# Patient Record
Sex: FEMALE | Race: WHITE | NOT HISPANIC OR LATINO | Employment: UNEMPLOYED | URBAN - METROPOLITAN AREA
[De-identification: names, ages, dates, MRNs, and addresses within clinical notes are randomized per-mention and may not be internally consistent; named-entity substitution may affect disease eponyms.]

---

## 2017-12-11 PROBLEM — I48.0 AF (PAROXYSMAL ATRIAL FIBRILLATION): Status: ACTIVE | Noted: 2017-12-11

## 2018-04-13 PROBLEM — I49.5: Status: ACTIVE | Noted: 2018-04-13

## 2018-04-14 PROBLEM — Z95.0 STATUS POST PLACEMENT OF OTHER CARDIAC PACEMAKER: Status: ACTIVE | Noted: 2018-04-14

## 2018-04-27 ENCOUNTER — TELEPHONE (OUTPATIENT)
Dept: PRIMARY CARE CLINIC | Facility: CLINIC | Age: 83
End: 2018-04-27

## 2018-04-27 ENCOUNTER — OFFICE VISIT (OUTPATIENT)
Dept: PRIMARY CARE CLINIC | Facility: CLINIC | Age: 83
End: 2018-04-27
Payer: MEDICARE

## 2018-04-27 ENCOUNTER — CLINICAL SUPPORT (OUTPATIENT)
Dept: PRIMARY CARE CLINIC | Facility: CLINIC | Age: 83
End: 2018-04-27
Payer: MEDICARE

## 2018-04-27 VITALS
WEIGHT: 173.5 LBS | OXYGEN SATURATION: 93 % | HEIGHT: 65 IN | BODY MASS INDEX: 28.91 KG/M2 | HEART RATE: 65 BPM | DIASTOLIC BLOOD PRESSURE: 69 MMHG | RESPIRATION RATE: 18 BRPM | SYSTOLIC BLOOD PRESSURE: 130 MMHG | TEMPERATURE: 98 F

## 2018-04-27 DIAGNOSIS — N30.90 CYSTITIS: Primary | ICD-10-CM

## 2018-04-27 DIAGNOSIS — N18.4 CKD (CHRONIC KIDNEY DISEASE) STAGE 4, GFR 15-29 ML/MIN: ICD-10-CM

## 2018-04-27 PROBLEM — E78.2 MIXED HYPERLIPIDEMIA: Status: ACTIVE | Noted: 2018-04-27

## 2018-04-27 PROBLEM — C18.9 COLON CANCER: Status: ACTIVE | Noted: 2018-04-27

## 2018-04-27 LAB
BILIRUB SERPL-MCNC: ABNORMAL MG/DL
BLOOD URINE, POC: ABNORMAL
COLOR, POC UA: YELLOW
GLUCOSE UR QL STRIP: NORMAL
KETONES UR QL STRIP: ABNORMAL
LEUKOCYTE ESTERASE URINE, POC: ABNORMAL
NITRITE, POC UA: POSITIVE
PH, POC UA: 5
PROTEIN, POC: ABNORMAL
SPECIFIC GRAVITY, POC UA: 1.01
UROBILINOGEN, POC UA: NORMAL

## 2018-04-27 PROCEDURE — 87186 SC STD MICRODIL/AGAR DIL: CPT

## 2018-04-27 PROCEDURE — 87086 URINE CULTURE/COLONY COUNT: CPT

## 2018-04-27 PROCEDURE — 87077 CULTURE AEROBIC IDENTIFY: CPT

## 2018-04-27 PROCEDURE — 99999 PR PBB SHADOW E&M-EST. PATIENT-LVL IV: CPT | Mod: PBBFAC,,, | Performed by: NURSE PRACTITIONER

## 2018-04-27 PROCEDURE — 99214 OFFICE O/P EST MOD 30 MIN: CPT | Mod: PBBFAC,27,PN | Performed by: NURSE PRACTITIONER

## 2018-04-27 PROCEDURE — 99999 PR PBB SHADOW E&M-EST. PATIENT-LVL I: CPT | Mod: PBBFAC,,,

## 2018-04-27 PROCEDURE — 87088 URINE BACTERIA CULTURE: CPT

## 2018-04-27 PROCEDURE — 99211 OFF/OP EST MAY X REQ PHY/QHP: CPT | Mod: PBBFAC,PN

## 2018-04-27 PROCEDURE — 81002 URINALYSIS NONAUTO W/O SCOPE: CPT | Mod: PBBFAC,PN | Performed by: NURSE PRACTITIONER

## 2018-04-27 PROCEDURE — 99214 OFFICE O/P EST MOD 30 MIN: CPT | Mod: S$PBB,,, | Performed by: NURSE PRACTITIONER

## 2018-04-27 RX ORDER — NITROFURANTOIN MACROCRYSTALS 50 MG/1
50 CAPSULE ORAL 2 TIMES DAILY
Qty: 14 CAPSULE | Refills: 0 | Status: SHIPPED | OUTPATIENT
Start: 2018-04-27 | End: 2018-04-27

## 2018-04-27 RX ORDER — MINOCYCLINE HYDROCHLORIDE 100 MG/1
100 CAPSULE ORAL 2 TIMES DAILY
Qty: 14 CAPSULE | Refills: 0 | Status: SHIPPED | OUTPATIENT
Start: 2018-04-27 | End: 2018-05-04

## 2018-04-27 RX ORDER — ALBUTEROL SULFATE 90 UG/1
2 AEROSOL, METERED RESPIRATORY (INHALATION) EVERY 4 HOURS PRN
COMMUNITY

## 2018-04-27 NOTE — TELEPHONE ENCOUNTER
----- Message from Carolyn Alejandro sent at 4/27/2018 11:46 AM CDT -----  Contact: Lety with [facility]  The doctors office sent over prescriptions for two antibiotics and they are trying to find out which one they need to fill for the patient.  Call Back#[phone]  Thanks     [facility] [address]  [address] [place] [id number]  Phone: [phone] Fax: [phone]

## 2018-04-27 NOTE — TELEPHONE ENCOUNTER
[facility], notified her I advised patient not to take macrobid and to fill monocyline. Mureil states she will fill meds for patient.

## 2018-04-27 NOTE — PROGRESS NOTES
"Chief Complaint  Chief Complaint   Patient presents with    Urinary Tract Infection       HPI  Alannah Saleem is a 84 y.o. female with multiple medical diagnoses as listed in the medical history and problem list that presents for urinary frequency and hesitancy.    Urinary frequency - Patient reports he onset of urinary frequency and hesitancy approximately one week ago.  No dysuria, no fevers, no back pain.  Accompanied by fatigue.  Presents with daughter who states that mother has appeared slightly disoriented over the last few days and appears as when she frequently gets urinary tract infections.    Pacemaker-patient with a history of sick sinus syndrome with pacemaker insertion approximately 2 weeks ago.  Under the care of Dr. Encarnacion.  Follow-up in May 19.    CKD4-patient currently under the care Dr. Encarnacion for cardiology.  States that he is aware of her renal function.  Has recently seen a nephrologist and isn't interested do so at this time.    PAST MEDICAL HISTORY:  Past Medical History:   Diagnosis Date    A-fib 2017    Abnormal stress test     Arthritis     Cancer     colon cancer 2013  had chemo    Carotid artery disease     CKD (chronic kidney disease)     CKD (chronic kidney disease)     Coronary artery disease     Hyperlipidemia     Hypertension     Hypoxia     Pulmonary hypertension     SSS (sick sinus syndrome) 2018       PAST SURGICAL HISTORY:  Past Surgical History:   Procedure Laterality Date    ABDOMINAL SURGERY      3" of colon was removed  colectomy    APPENDECTOMY      CHOLECYSTECTOMY      coloscopy      CORONARY ARTERY BYPASS GRAFT      EYE SURGERY      cataracts    HYSTERECTOMY      loop recorder Left     misc  2017    loop recorder implant       SOCIAL HISTORY:  Social History     Social History    Marital status:      Spouse name: N/A    Number of children: N/A    Years of education: N/A     Occupational History    Not on file.     Social History Main " "Topics    Smoking status: Never Smoker    Smokeless tobacco: Never Used    Alcohol use No    Drug use: No    Sexual activity: Not on file     Other Topics Concern    Not on file     Social History Narrative    No narrative on file       FAMILY HISTORY:  History reviewed. No pertinent family history.    ALLERGIES AND MEDICATIONS: updated and reviewed.  Review of patient's allergies indicates:   Allergen Reactions    Levaquin [levofloxacin] Swelling    Lisinopril Rash    Penicillins Swelling    Sulfa (sulfonamide antibiotics)      Current Outpatient Prescriptions   Medication Sig Dispense Refill    albuterol (PROAIR HFA) 90 mcg/actuation inhaler Take 2 puffs by mouth every 4 (four) hours as needed.      amiodarone (PACERONE) 200 MG Tab Take 200 mg by mouth once daily.      aspirin (ECOTRIN) 81 MG EC tablet Take 81 mg by mouth once daily.       atorvastatin (LIPITOR) 80 MG tablet Take 40 mg by mouth every evening. Note dosage ordered 40 mg      fluticasone (FLONASE) 50 mcg/actuation nasal spray 2 sprays by Each Nare route 2 (two) times daily.       nitrofurantoin (MACRODANTIN) 50 MG capsule Take 1 capsule (50 mg total) by mouth 2 (two) times daily. 14 capsule 0     No current facility-administered medications for this visit.          ROS  Review of Systems      PHYSICAL EXAM  Vitals:    04/27/18 0951   BP: 130/69   BP Location: Right arm   Patient Position: Sitting   BP Method: Medium (Automatic)   Pulse: 65   Resp: 18   Temp: 98.1 °F (36.7 °C)   TempSrc: Oral   SpO2: (!) 93%   Weight: 78.7 kg (173 lb 8 oz)   Height: 5' 5" (1.651 m)    Body mass index is 28.87 kg/m².  Weight: 78.7 kg (173 lb 8 oz)   Height: 5' 5" (165.1 cm)     Physical Exam      Health Maintenance       Date Due Completion Date    TETANUS VACCINE 11/23/1951 ---    DEXA SCAN 11/23/1973 ---    Zoster Vaccine 11/23/1993 ---    Pneumococcal (65+) (1 of 2 - PCV13) 11/23/1998 ---    Influenza Vaccine 08/01/2017 ---    Lipid Panel 01/13/2021 " 1/13/2016            Assessment & Plan    Alannah was seen today for urinary tract infection.    Diagnoses and all orders for this visit:    Cystitis  -     POCT URINE DIPSTICK WITHOUT MICROSCOPE  -     Urine culture  Called in minocycline for UTI.  Recommended recheck after completion of abx.      CKD (chronic kidney disease) stage 4, GFR 15-29 ml/min        Patient and daughter not interested in  seeing a nephrologist at this time.  Instructed to discuss with Dr. Encarnacion at her next visit.            Follow-up: No Follow-up on file.  \

## 2018-04-30 LAB — BACTERIA UR CULT: NORMAL

## 2018-05-11 ENCOUNTER — OFFICE VISIT (OUTPATIENT)
Dept: PRIMARY CARE CLINIC | Facility: CLINIC | Age: 83
End: 2018-05-11
Payer: MEDICARE

## 2018-05-11 VITALS
WEIGHT: 170.38 LBS | HEART RATE: 72 BPM | HEIGHT: 65 IN | OXYGEN SATURATION: 96 % | RESPIRATION RATE: 18 BRPM | DIASTOLIC BLOOD PRESSURE: 68 MMHG | SYSTOLIC BLOOD PRESSURE: 110 MMHG | BODY MASS INDEX: 28.39 KG/M2 | TEMPERATURE: 98 F

## 2018-05-11 DIAGNOSIS — N30.90 CYSTITIS: Primary | ICD-10-CM

## 2018-05-11 DIAGNOSIS — N18.4 CKD (CHRONIC KIDNEY DISEASE) STAGE 4, GFR 15-29 ML/MIN: ICD-10-CM

## 2018-05-11 DIAGNOSIS — B37.31 VAGINAL CANDIDIASIS: ICD-10-CM

## 2018-05-11 LAB
BILIRUB SERPL-MCNC: ABNORMAL MG/DL
BLOOD URINE, POC: ABNORMAL
COLOR, POC UA: ABNORMAL
GLUCOSE UR QL STRIP: NORMAL
KETONES UR QL STRIP: ABNORMAL
LEUKOCYTE ESTERASE URINE, POC: ABNORMAL
NITRITE, POC UA: ABNORMAL
PH, POC UA: 5
PROTEIN, POC: ABNORMAL
SPECIFIC GRAVITY, POC UA: 1.01
UROBILINOGEN, POC UA: NORMAL

## 2018-05-11 PROCEDURE — 87088 URINE BACTERIA CULTURE: CPT

## 2018-05-11 PROCEDURE — 99999 PR PBB SHADOW E&M-EST. PATIENT-LVL IV: CPT | Mod: PBBFAC,,, | Performed by: NURSE PRACTITIONER

## 2018-05-11 PROCEDURE — 99214 OFFICE O/P EST MOD 30 MIN: CPT | Mod: S$PBB,,, | Performed by: NURSE PRACTITIONER

## 2018-05-11 PROCEDURE — 87086 URINE CULTURE/COLONY COUNT: CPT

## 2018-05-11 PROCEDURE — 99214 OFFICE O/P EST MOD 30 MIN: CPT | Mod: PBBFAC,PN | Performed by: NURSE PRACTITIONER

## 2018-05-11 PROCEDURE — 87077 CULTURE AEROBIC IDENTIFY: CPT

## 2018-05-11 PROCEDURE — 87186 SC STD MICRODIL/AGAR DIL: CPT

## 2018-05-11 PROCEDURE — 81002 URINALYSIS NONAUTO W/O SCOPE: CPT | Mod: PBBFAC,PN | Performed by: NURSE PRACTITIONER

## 2018-05-11 RX ORDER — ESCITALOPRAM OXALATE 10 MG/1
1 TABLET ORAL DAILY
COMMUNITY
Start: 2018-05-09 | End: 2018-06-25

## 2018-05-11 RX ORDER — ATORVASTATIN CALCIUM 40 MG/1
1 TABLET, FILM COATED ORAL NIGHTLY
Refills: 1 | COMMUNITY
Start: 2018-05-07

## 2018-05-11 RX ORDER — FLUCONAZOLE 150 MG/1
150 TABLET ORAL ONCE
Qty: 1 TABLET | Refills: 0 | Status: SHIPPED | OUTPATIENT
Start: 2018-05-11 | End: 2018-05-11

## 2018-05-11 NOTE — PROGRESS NOTES
Chief Complaint  Chief Complaint   Patient presents with    Follow-up       HPI  Alannah Saleem is a 84 y.o. female with multiple medical diagnoses as listed in the medical history and problem list that presents for follow-up for cystitis.     Cystitis-patient previously seen with cystitis treated doxycycline due to increased allergy and poor kidney function. Urine culture positive with sensitivity to doxycycline.  Reports alleviation of previous symptoms of dysuria urinary frequency.  No fever or chills.  No flank pain. Reports the presence of a new onset rash to her perineum.  Started with initiation of antibiotics.  Patient with a history of yeast infections use of antibiotics.  Has been treating with Monistat with no alleviation of symptoms.  Described as a burning with urination particularly in her perineal/labial area.  Patient is continent of urine and stool aside from leakage and often stays wet in her perineum.    Fatigue - Presents to clinic with her daughter who is concerned about recent changes.  Reports current weakness, fatigue.  Patient to the ER 2 weeks ago for chest pain.  Chest x-ray completed within normal limits.     CAD - Current patient of Dr. Encarnacion, seen last week for pacemaker placement follow-up. EKG completed. Lab work completed with noted drop in GFR. Patient has previously seen nephrology in 2016 following her CABG with no further follow-up. Patient denies chest pain and SOB at this time. Placed on lexapro per Dr. Encarnacion for treatment of her fatigue.       PAST MEDICAL HISTORY:  Past Medical History:   Diagnosis Date    A-fib 2017    Abnormal stress test     Arthritis     Cancer     colon cancer 2013  had chemo    Carotid artery disease     CKD (chronic kidney disease)     CKD (chronic kidney disease)     Coronary artery disease     Hyperlipidemia     Hypertension     Hypoxia     Pulmonary hypertension     SSS (sick sinus syndrome) 2018       PAST SURGICAL HISTORY:  Past  "Surgical History:   Procedure Laterality Date    ABDOMINAL SURGERY      3" of colon was removed  colectomy    APPENDECTOMY      CHOLECYSTECTOMY      coloscopy      CORONARY ARTERY BYPASS GRAFT      EYE SURGERY      cataracts    HYSTERECTOMY      loop recorder Left     misc  2017    loop recorder implant       SOCIAL HISTORY:  Social History     Social History    Marital status:      Spouse name: N/A    Number of children: N/A    Years of education: N/A     Occupational History    Not on file.     Social History Main Topics    Smoking status: Never Smoker    Smokeless tobacco: Never Used    Alcohol use No    Drug use: No    Sexual activity: Not on file     Other Topics Concern    Not on file     Social History Narrative    No narrative on file       FAMILY HISTORY:  History reviewed. No pertinent family history.    ALLERGIES AND MEDICATIONS: updated and reviewed.  Review of patient's allergies indicates:   Allergen Reactions    Levaquin [levofloxacin] Swelling    Lisinopril Rash    Penicillins Swelling    Sulfa (sulfonamide antibiotics)      Current Outpatient Prescriptions   Medication Sig Dispense Refill    albuterol (PROAIR HFA) 90 mcg/actuation inhaler Take 2 puffs by mouth every 4 (four) hours as needed.      amiodarone (PACERONE) 200 MG Tab Take 200 mg by mouth once daily.      aspirin (ECOTRIN) 81 MG EC tablet Take 81 mg by mouth once daily.       atorvastatin (LIPITOR) 40 MG tablet Take 1 tablet by mouth once daily.  1    escitalopram oxalate (LEXAPRO) 10 MG tablet Take 1 tablet by mouth once daily.      fluticasone (FLONASE) 50 mcg/actuation nasal spray 2 sprays by Each Nare route 2 (two) times daily.       fluconazole (DIFLUCAN) 150 MG Tab Take 1 tablet (150 mg total) by mouth once. 1 tablet 0    miconazole nitrate 2% (CRITIC-AID CLEAR AF) 2 % Oint Apply topically 2 (two) times daily.  0     No current facility-administered medications for this visit.  " "        ROS  Review of Systems   Constitutional: Positive for fatigue. Negative for chills and fever.   HENT: Negative for congestion, rhinorrhea, sinus pressure and sore throat.    Respiratory: Negative for cough, chest tightness and shortness of breath.    Cardiovascular: Negative for chest pain and palpitations.   Gastrointestinal: Negative for blood in stool, diarrhea, nausea and vomiting.   Genitourinary: Positive for frequency and vaginal pain. Negative for dysuria, hematuria and urgency.   Musculoskeletal: Negative for arthralgias and back pain.   Skin: Negative for rash and wound.   Neurological: Positive for weakness. Negative for dizziness and headaches.   Psychiatric/Behavioral: Positive for dysphoric mood and sleep disturbance. The patient is not nervous/anxious.          PHYSICAL EXAM  Vitals:    05/11/18 1043   BP: 110/68   BP Location: Right arm   Patient Position: Sitting   BP Method: Medium (Automatic)   Pulse: 72   Resp: 18   Temp: 98 °F (36.7 °C)   TempSrc: Oral   SpO2: 96%   Weight: 77.3 kg (170 lb 6.4 oz)   Height: 5' 5" (1.651 m)    Body mass index is 28.36 kg/m².  Weight: 77.3 kg (170 lb 6.4 oz)   Height: 5' 5" (165.1 cm)     Physical Exam   Constitutional: She is oriented to person, place, and time. She appears well-developed and well-nourished.   HENT:   Head: Normocephalic.   Right Ear: Tympanic membrane normal.   Left Ear: Tympanic membrane normal.   Mouth/Throat: Uvula is midline, oropharynx is clear and moist and mucous membranes are normal.   Eyes: Conjunctivae are normal.   Cardiovascular: Normal rate, regular rhythm and normal pulses.    Murmur heard.   Systolic murmur is present with a grade of 3/6   Pulses:       Radial pulses are 2+ on the right side, and 2+ on the left side.   No LE swelling noted   Pulmonary/Chest: Effort normal and breath sounds normal. She has no wheezes.   Abdominal: Soft. Bowel sounds are normal. There is no tenderness.   Musculoskeletal: She exhibits no " edema.   Lymphadenopathy:     She has no cervical adenopathy.   Neurological: She is alert and oriented to person, place, and time.   Skin: Skin is warm and dry. No rash noted.   Psychiatric: She has a normal mood and affect.         Health Maintenance       Date Due Completion Date    TETANUS VACCINE 11/23/1951 ---    DEXA SCAN 11/23/1973 ---    Zoster Vaccine 11/23/1993 ---    Pneumococcal (65+) (1 of 2 - PCV13) 11/23/1998 ---    Influenza Vaccine 08/01/2018 ---    Lipid Panel 01/13/2021 1/13/2016            Assessment & Plan    Alannah was seen today for follow-up.    Diagnoses and all orders for this visit:    Cystitis  -     POCT urine dipstick without microscope  -     Urine culture  -     fluconazole (DIFLUCAN) 150 MG Tab; Take 1 tablet (150 mg total) by mouth once.    CKD (chronic kidney disease) stage 4, GFR 15-29 ml/min  -     Ambulatory Referral to Nephrology  Rapid decline over the last 30 days.  Could very likely be due to recent urinary tract infection.  Continue to monitor.    Vaginal candidiasis  -     fluconazole (DIFLUCAN) 150 MG Tab; Take 1 tablet (150 mg total) by mouth once.    Other orders  -     miconazole nitrate 2% (CRITIC-AID CLEAR AF) 2 % Oint; Apply topically 2 (two) times daily.          Follow-up: No Follow-up on file.

## 2018-05-14 ENCOUNTER — TELEPHONE (OUTPATIENT)
Dept: PRIMARY CARE CLINIC | Facility: CLINIC | Age: 83
End: 2018-05-14

## 2018-05-14 LAB — BACTERIA UR CULT: NORMAL

## 2018-05-14 NOTE — TELEPHONE ENCOUNTER
----- Message from Azeb Alegria sent at 5/14/2018 12:23 PM CDT -----  Contact: [place] with [facility] phone ext [phone]  [place] with [facility] phone ext [phone], Calling for you to complete the collection task that was started on Friday. Please advise. Thanks.

## 2018-05-14 NOTE — TELEPHONE ENCOUNTER
----- Message from Azeb Alegria sent at 5/14/2018  3:08 PM CDT -----  Contact: Daughter  Type:  Test Results    Who Called:  Yuki  Name of Test (Lab/Mammo/Etc):  Ann  Date of Test:  05/11/2018  Ordering Provider:  Edna Gonsalez NP  Where the test was performed:  Office  Best Call Back Number:  [phone]  Additional Information:  Please call her. Thanks.

## 2018-05-14 NOTE — TELEPHONE ENCOUNTER
----- Message from Azeb Alegria sent at 5/14/2018  3:08 PM CDT -----  Contact: Daughter  Type:  Test Results    Who Called:  Yuki  Name of Test (Lab/Mammo/Etc):  Ann  Date of Test:  05/11/2018  Ordering Provider:  Edna Gonsalez NP  Where the test was performed:  Office  Best Call Back Number:  [phone]  Additional Information:  Please call her. Thanks.

## 2018-05-15 ENCOUNTER — TELEPHONE (OUTPATIENT)
Dept: PRIMARY CARE CLINIC | Facility: CLINIC | Age: 83
End: 2018-05-15

## 2018-05-15 RX ORDER — CIPROFLOXACIN 250 MG/1
250 TABLET, FILM COATED ORAL 2 TIMES DAILY
Qty: 14 TABLET | Refills: 0 | Status: SHIPPED | OUTPATIENT
Start: 2018-05-15 | End: 2018-05-25

## 2018-05-15 NOTE — TELEPHONE ENCOUNTER
Spoke with patient's daughter to clarify actual allergies to antibiotics.  Difficulty finding an antibiotic to treat her urinary tract infection considering her allergies and kidney function.  Daughter states the patient has an allergy to Keflex and cannot take it.  Reaction is unknown.  Throughout the discussion, discovered that patient has taken ciprofloxacin for urinary tract infections in the past with absolutely no reaction.  After questioning the relationship between Levaquin and ciprofloxacin the daughter explained that the mother had lip swelling after taking the Levaquin but that she was concerned that it was due to something else.  Discussed with her that I will start the mother on Cipro but to keep a close eye on her and give her Benadryl sugar reaction occur.

## 2018-05-15 NOTE — TELEPHONE ENCOUNTER
Spoke with pt's daughter states patient can not take keflex. But she does not think patient has ever taken rocephin. Notified her I will call her when something is sent in. States patient is still having symptoms of uti.

## 2018-05-15 NOTE — TELEPHONE ENCOUNTER
Please call the patient and let her know that she continues to have a urinary tract infection.  The bacteria is resistant to the antibiotic that we previously placed her on.  Inquire as to whether not she has ever taken a cephalosporin antibiotic including Keflex, Rocephin?  And has she ever had a reaction?  Please let me know.

## 2018-05-21 ENCOUNTER — TELEPHONE (OUTPATIENT)
Dept: PRIMARY CARE CLINIC | Facility: CLINIC | Age: 83
End: 2018-05-21

## 2018-05-21 NOTE — TELEPHONE ENCOUNTER
Patients daughter wants to know if it is okay that [facility] changed her antibiotic to macrobid. Went to the ER on 5/17/2018 for her uti and they changed her rx

## 2018-05-21 NOTE — TELEPHONE ENCOUNTER
----- Message from Azeb Alegria sent at 5/21/2018  1:24 PM CDT -----  Contact: Daughter  Type: Needs Medical Advice    Who Called:  Yuki, daughter  Symptoms (please be specific):  N/A  How long has patient had these symptoms:  N/A  Pharmacy name and phone #:  N/A  Best Call Back Number: [phone]  Additional Information: Calling to inform you patient went to [facility] for UTI, they changed her antibiotic. She wants to make sure they did the right thing. Please call her. Thanks.

## 2018-05-21 NOTE — TELEPHONE ENCOUNTER
Spoke with patient's daughter and let her know that I was concerned about her kidney function with the patient being on Macrobid.  Instructed her to stop the Macrobid now in return to clinic to  a urine cup.  Instructed her to complete a urine specimen at home and return to the clinic.  Reports that her mother's blood pressure is currently stable and that she is hydrating appropriately.

## 2018-05-22 ENCOUNTER — CLINICAL SUPPORT (OUTPATIENT)
Dept: PRIMARY CARE CLINIC | Facility: CLINIC | Age: 83
End: 2018-05-22
Payer: MEDICARE

## 2018-05-22 DIAGNOSIS — R35.0 URINARY FREQUENCY: ICD-10-CM

## 2018-05-22 DIAGNOSIS — R82.998 URINE WHITE BLOOD CELLS INCREASED: Primary | ICD-10-CM

## 2018-05-22 LAB
BILIRUB SERPL-MCNC: ABNORMAL MG/DL
BLOOD URINE, POC: ABNORMAL
COLOR, POC UA: YELLOW
GLUCOSE UR QL STRIP: NORMAL
KETONES UR QL STRIP: ABNORMAL
LEUKOCYTE ESTERASE URINE, POC: ABNORMAL
NITRITE, POC UA: ABNORMAL
PH, POC UA: 5
PROTEIN, POC: ABNORMAL
SPECIFIC GRAVITY, POC UA: 1.01
UROBILINOGEN, POC UA: NORMAL

## 2018-05-22 PROCEDURE — 81002 URINALYSIS NONAUTO W/O SCOPE: CPT | Mod: PBBFAC,PN

## 2018-05-22 PROCEDURE — 87077 CULTURE AEROBIC IDENTIFY: CPT

## 2018-05-22 PROCEDURE — 87088 URINE BACTERIA CULTURE: CPT

## 2018-05-22 PROCEDURE — 87186 SC STD MICRODIL/AGAR DIL: CPT

## 2018-05-22 PROCEDURE — 99211 OFF/OP EST MAY X REQ PHY/QHP: CPT | Mod: PBBFAC,PN

## 2018-05-22 PROCEDURE — 87086 URINE CULTURE/COLONY COUNT: CPT

## 2018-05-22 PROCEDURE — 99999 PR PBB SHADOW E&M-EST. PATIENT-LVL I: CPT | Mod: PBBFAC,,,

## 2018-05-24 ENCOUNTER — TELEPHONE (OUTPATIENT)
Dept: PRIMARY CARE CLINIC | Facility: CLINIC | Age: 83
End: 2018-05-24

## 2018-05-24 LAB — BACTERIA UR CULT: NORMAL

## 2018-05-24 NOTE — TELEPHONE ENCOUNTER
Spoke with patients daughter. Notified her that we have not received results yet. States her understanding

## 2018-05-24 NOTE — TELEPHONE ENCOUNTER
----- Message from Azeb Alegria sent at 5/24/2018  3:02 PM CDT -----  Contact: Daughter  Type:  Test Results    Who Called:  Yuki, daughter  Name of Test (Lab/Mammo/Etc):  Urine  Date of Test:  5/22/18  Ordering Provider:  Edna Gonsalez NP  Where the test was performed:  Dropped off to office  Best Call Back Number:  [phone]  Additional Information:  Please call her. Thanks.

## 2018-05-25 ENCOUNTER — TELEPHONE (OUTPATIENT)
Dept: PRIMARY CARE CLINIC | Facility: CLINIC | Age: 83
End: 2018-05-25

## 2018-05-25 NOTE — TELEPHONE ENCOUNTER
----- Message from Yolanda Jones sent at 5/25/2018 11:53 AM CDT -----  Please call patients daughter in regards to patient.  Call Yuki at [phone].

## 2018-05-25 NOTE — TELEPHONE ENCOUNTER
After receiving the sensitivities of the urine culture I spoke with the daughter personally and recommended that she go to the emergency room.  The bacteria isolated in her urine is sensitive generally to IV antibiotics and, in lieu of her poor kidney function, she will need close monitoring.  I have called the ER did inform them that she will be coming today.

## 2018-06-18 ENCOUNTER — TELEPHONE (OUTPATIENT)
Dept: PRIMARY CARE CLINIC | Facility: CLINIC | Age: 83
End: 2018-06-18

## 2018-06-18 NOTE — TELEPHONE ENCOUNTER
----- Message from Yolanda Robert sent at 6/18/2018  3:42 PM CDT -----  Patient states that she need to know why patient received a call to come back in for more blood work.  Please call Annamarie at [phone]

## 2018-06-25 ENCOUNTER — OFFICE VISIT (OUTPATIENT)
Dept: PRIMARY CARE CLINIC | Facility: CLINIC | Age: 83
End: 2018-06-25
Payer: MEDICARE

## 2018-06-25 VITALS
RESPIRATION RATE: 18 BRPM | WEIGHT: 177 LBS | TEMPERATURE: 99 F | HEART RATE: 66 BPM | HEIGHT: 65 IN | OXYGEN SATURATION: 94 % | DIASTOLIC BLOOD PRESSURE: 64 MMHG | SYSTOLIC BLOOD PRESSURE: 126 MMHG | BODY MASS INDEX: 29.49 KG/M2

## 2018-06-25 DIAGNOSIS — N30.90 CYSTITIS: Primary | ICD-10-CM

## 2018-06-25 PROCEDURE — 99214 OFFICE O/P EST MOD 30 MIN: CPT | Mod: PBBFAC,PN | Performed by: NURSE PRACTITIONER

## 2018-06-25 PROCEDURE — 99213 OFFICE O/P EST LOW 20 MIN: CPT | Mod: S$PBB,,, | Performed by: NURSE PRACTITIONER

## 2018-06-25 PROCEDURE — 99999 PR PBB SHADOW E&M-EST. PATIENT-LVL IV: CPT | Mod: PBBFAC,,, | Performed by: NURSE PRACTITIONER

## 2018-06-25 RX ORDER — METOPROLOL SUCCINATE 25 MG/1
1 TABLET, EXTENDED RELEASE ORAL DAILY
Refills: 1 | COMMUNITY
Start: 2018-06-15

## 2018-06-25 NOTE — PROGRESS NOTES
"Chief Complaint  Chief Complaint   Patient presents with    Urinary Tract Infection    referral to urology       HPI  Alannah Saleem is a 84 y.o. female with multiple medical diagnoses as listed in the medical history and problem list that presents for suprapubic pressure.    Suprapubic pressure-patient presents with daughter who reports a mild mental status change described as her mother just being off.  Daughter said this generally correlates with the presence of a urinary tract infection.  Has previously been tested for UTI using clean-catch urine and found to be positive for resistant bacteria.  Patient to the ER in May of this year and catheterize for clean urine specimen.  Specimen found to have no growth at this time.  Presents today with continued complaints of suprapubic pressure.  No dysuria.  Reports urinary frequency.  No hesitancy.  No fever or chills.  No back or flank pain. Patient denies vaginal complaints of discharge at this time.  Patient with a history of prolapsed bladder and bladder lift x2 in the past.  No recent urologist visit.    PAST MEDICAL HISTORY:  Past Medical History:   Diagnosis Date    A-fib 2017    Abnormal stress test     Arthritis     Cancer     colon cancer 2013  had chemo    Carotid artery disease     CKD (chronic kidney disease)     CKD (chronic kidney disease)     Coronary artery disease     Hyperlipidemia     Hypertension     Hypoxia     Pulmonary hypertension     SSS (sick sinus syndrome) 2018       PAST SURGICAL HISTORY:  Past Surgical History:   Procedure Laterality Date    ABDOMINAL SURGERY      3" of colon was removed  colectomy    APPENDECTOMY      CHOLECYSTECTOMY      coloscopy      CORONARY ARTERY BYPASS GRAFT      EYE SURGERY      cataracts    HYSTERECTOMY      loop recorder Left     misc  2017    loop recorder implant       SOCIAL HISTORY:  Social History     Social History    Marital status:      Spouse name: N/A    Number of " children: N/A    Years of education: N/A     Occupational History    Not on file.     Social History Main Topics    Smoking status: Never Smoker    Smokeless tobacco: Never Used    Alcohol use No    Drug use: No    Sexual activity: Not on file     Other Topics Concern    Not on file     Social History Narrative    No narrative on file       FAMILY HISTORY:  History reviewed. No pertinent family history.    ALLERGIES AND MEDICATIONS: updated and reviewed.  Review of patient's allergies indicates:   Allergen Reactions    Levaquin [levofloxacin] Swelling     Spoke with daughter who states patient has taken cipro several times in the past, no reaction at all. Questions levaquin allergy, states lips swelling may be due to something else.     Lisinopril Rash    Keflex [cephalexin]     Penicillins Swelling    Sulfa (sulfonamide antibiotics)      Current Outpatient Prescriptions   Medication Sig Dispense Refill    albuterol (PROAIR HFA) 90 mcg/actuation inhaler Take 2 puffs by mouth every 4 (four) hours as needed.      amiodarone (PACERONE) 200 MG Tab Take 200 mg by mouth once daily.      aspirin (ECOTRIN) 81 MG EC tablet Take 81 mg by mouth once daily.       atorvastatin (LIPITOR) 40 MG tablet Take 1 tablet by mouth once daily.  1    metoprolol succinate (TOPROL-XL) 25 MG 24 hr tablet Take 1 tablet by mouth once daily.  1    miconazole nitrate 2% (CRITIC-AID CLEAR AF) 2 % Oint Apply topically 2 (two) times daily.  0     No current facility-administered medications for this visit.          ROS  Review of Systems   Constitutional: Negative for chills, fatigue and fever.   HENT: Negative for congestion, rhinorrhea, sinus pressure and sore throat.    Respiratory: Negative for cough, chest tightness and shortness of breath.    Cardiovascular: Negative for chest pain and palpitations.   Gastrointestinal: Negative for blood in stool, diarrhea, nausea and vomiting.   Genitourinary: Positive for frequency,  "pelvic pain and urgency. Negative for dysuria and hematuria.   Musculoskeletal: Negative for arthralgias and back pain.   Skin: Negative for rash and wound.   Neurological: Negative for dizziness and headaches.   Psychiatric/Behavioral: Negative for dysphoric mood and sleep disturbance. The patient is not nervous/anxious.          PHYSICAL EXAM  Vitals:    06/25/18 1352   BP: 126/64   BP Location: Right arm   Patient Position: Sitting   BP Method: Medium (Automatic)   Pulse: 66   Resp: 18   Temp: 98.6 °F (37 °C)   TempSrc: Oral   SpO2: (!) 94%   Weight: 80.3 kg (177 lb)   Height: 5' 5" (1.651 m)    Body mass index is 29.45 kg/m².  Weight: 80.3 kg (177 lb)   Height: 5' 5" (165.1 cm)     Physical Exam   Constitutional: She is oriented to person, place, and time. She appears well-developed and well-nourished.   HENT:   Head: Normocephalic.   Right Ear: Tympanic membrane normal.   Left Ear: Tympanic membrane normal.   Mouth/Throat: Uvula is midline, oropharynx is clear and moist and mucous membranes are normal.   Eyes: Conjunctivae are normal.   Cardiovascular: Normal rate, regular rhythm, normal heart sounds and normal pulses.    No murmur heard.  Pulses:       Radial pulses are 2+ on the right side, and 2+ on the left side.   No LE swelling noted   Pulmonary/Chest: Effort normal and breath sounds normal. She has no wheezes.   Abdominal: Soft. Bowel sounds are normal. There is no tenderness.   Musculoskeletal: She exhibits no edema.   Lymphadenopathy:     She has no cervical adenopathy.   Neurological: She is alert and oriented to person, place, and time.   Skin: Skin is warm and dry. No rash noted.   Psychiatric: She has a normal mood and affect.         Health Maintenance       Date Due Completion Date    TETANUS VACCINE 11/23/1951 ---    DEXA SCAN 11/23/1973 ---    Zoster Vaccine 11/23/1993 ---    Pneumococcal (65+) (1 of 2 - PCV13) 11/23/1998 ---    Influenza Vaccine 08/01/2018 ---    Lipid Panel 06/27/2022 " 6/27/2017            Assessment & Plan    Alannah was seen today for urinary tract infection and referral to urology.    Diagnoses and all orders for this visit:    Cystitis  -     Ambulatory Referral to Urology  Hesitant to collect additional urine culture for false positive results and unnecessary treatment.          Follow-up: No Follow-up on file.

## 2018-07-27 ENCOUNTER — TELEPHONE (OUTPATIENT)
Dept: PRIMARY CARE CLINIC | Facility: CLINIC | Age: 83
End: 2018-07-27

## 2018-07-27 NOTE — TELEPHONE ENCOUNTER
----- Message from Azeb Alegria sent at 7/27/2018  2:02 PM CDT -----  Contact: Daughter  Type: Needs Medical Advice    Who Called:  Jo, daughter  Symptoms (please be specific):  N/A  How long has patient had these symptoms:  N/A  Pharmacy name and phone #:  N/A  Best Call Back Number: [phone]  Additional Information: Calling to talk to Edna regarding Rx Microbid. Please call her. Thanks.

## 2019-10-31 ENCOUNTER — CLINICAL SUPPORT (OUTPATIENT)
Dept: PRIMARY CARE CLINIC | Facility: CLINIC | Age: 84
End: 2019-10-31
Payer: MEDICARE

## 2019-10-31 ENCOUNTER — OFFICE VISIT (OUTPATIENT)
Dept: PRIMARY CARE CLINIC | Facility: CLINIC | Age: 84
End: 2019-10-31
Payer: MEDICARE

## 2019-10-31 VITALS
RESPIRATION RATE: 16 BRPM | BODY MASS INDEX: 29.99 KG/M2 | HEART RATE: 63 BPM | TEMPERATURE: 98 F | DIASTOLIC BLOOD PRESSURE: 64 MMHG | OXYGEN SATURATION: 95 % | HEIGHT: 65 IN | WEIGHT: 180 LBS | SYSTOLIC BLOOD PRESSURE: 118 MMHG

## 2019-10-31 DIAGNOSIS — N30.00 ACUTE CYSTITIS WITHOUT HEMATURIA: Primary | ICD-10-CM

## 2019-10-31 DIAGNOSIS — N18.4 CKD (CHRONIC KIDNEY DISEASE) STAGE 4, GFR 15-29 ML/MIN: ICD-10-CM

## 2019-10-31 PROCEDURE — 87077 CULTURE AEROBIC IDENTIFY: CPT

## 2019-10-31 PROCEDURE — 87086 URINE CULTURE/COLONY COUNT: CPT

## 2019-10-31 PROCEDURE — 87186 SC STD MICRODIL/AGAR DIL: CPT

## 2019-10-31 PROCEDURE — 99213 OFFICE O/P EST LOW 20 MIN: CPT | Mod: PBBFAC,PN | Performed by: FAMILY MEDICINE

## 2019-10-31 PROCEDURE — 99999 PR PBB SHADOW E&M-EST. PATIENT-LVL III: ICD-10-PCS | Mod: PBBFAC,,, | Performed by: FAMILY MEDICINE

## 2019-10-31 PROCEDURE — 99999 PR PBB SHADOW E&M-EST. PATIENT-LVL III: CPT | Mod: PBBFAC,,, | Performed by: FAMILY MEDICINE

## 2019-10-31 PROCEDURE — 99214 OFFICE O/P EST MOD 30 MIN: CPT | Mod: S$PBB,,, | Performed by: FAMILY MEDICINE

## 2019-10-31 PROCEDURE — 99214 PR OFFICE/OUTPT VISIT, EST, LEVL IV, 30-39 MIN: ICD-10-PCS | Mod: S$PBB,,, | Performed by: FAMILY MEDICINE

## 2019-10-31 PROCEDURE — 87088 URINE BACTERIA CULTURE: CPT

## 2019-10-31 RX ORDER — NITROFURANTOIN 25; 75 MG/1; MG/1
100 CAPSULE ORAL 2 TIMES DAILY
Qty: 14 CAPSULE | Refills: 0 | Status: SHIPPED | OUTPATIENT
Start: 2019-10-31 | End: 2019-11-07

## 2019-10-31 NOTE — PROGRESS NOTES
"Subjective:       Patient ID: Alannah Saleem is a 85 y.o. female.    Chief Complaint: Urinary Frequency (pt stated more urgency, burning, and pressure x3 days)    Urinary Tract Infection    This is a new problem. The current episode started in the past 7 days (3 days ago). The problem occurs every urination. The problem has been gradually worsening. The quality of the pain is described as burning. There has been no fever. She is not sexually active. There is a history of pyelonephritis. Associated symptoms include frequency and urgency. Pertinent negatives include no behavior changes, chills, flank pain, hematuria, hesitancy, possible pregnancy, vomiting, bubble bath use or rash. Treatments tried: AZO. The treatment provided mild relief. Her past medical history is significant for recurrent UTIs.     Review of Systems   Constitutional: Negative for chills and fever.   Gastrointestinal: Negative for vomiting.   Genitourinary: Positive for dysuria, frequency and urgency. Negative for flank pain, hematuria and hesitancy.   Skin: Negative for rash.   Psychiatric/Behavioral: Negative for agitation and confusion.       Objective:      Vitals:    10/31/19 1129   BP: 118/64   BP Location: Left arm   Patient Position: Sitting   BP Method: Medium (Manual)   Pulse: 63   Resp: 16   Temp: 98.3 °F (36.8 °C)   TempSrc: Oral   SpO2: 95%   Weight: 81.6 kg (180 lb)   Height: 5' 5" (1.651 m)     Physical Exam   Constitutional: She appears well-developed and well-nourished.   HENT:   Head: Normocephalic and atraumatic.   Cardiovascular: Normal rate, regular rhythm and normal heart sounds.   Pulmonary/Chest: Effort normal and breath sounds normal.   Abdominal: Soft. She exhibits no distension. There is no tenderness. There is no guarding and no CVA tenderness.   Musculoskeletal: She exhibits no edema.   Neurological: She is alert.   Skin: Skin is warm and dry.   Nursing note and vitals reviewed.      Assessment:       1. Acute " cystitis without hematuria    2. CKD (chronic kidney disease) stage 4, GFR 15-29 ml/min        Plan:       Acute cystitis without hematuria  -     Urine culture  -     POCT urinalysis, dipstick or tablet reag  -     nitrofurantoin, macrocrystal-monohydrate, (MACROBID) 100 MG capsule; Take 1 capsule (100 mg total) by mouth 2 (two) times daily. for 7 days  Dispense: 14 capsule; Refill: 0  Limited antibiotic options due to patients CKD4 and allergies. Says she has used Macrobid in the past with good results, so will use that for now. F/U C&S    Medication List with Changes/Refills   New Medications    NITROFURANTOIN, MACROCRYSTAL-MONOHYDRATE, (MACROBID) 100 MG CAPSULE    Take 1 capsule (100 mg total) by mouth 2 (two) times daily. for 7 days   Current Medications    ALBUTEROL (PROAIR HFA) 90 MCG/ACTUATION INHALER    Take 2 puffs by mouth every 4 (four) hours as needed.    AMIODARONE (PACERONE) 200 MG TAB    Take 200 mg by mouth once daily.    ASPIRIN (ECOTRIN) 81 MG EC TABLET    Take 81 mg by mouth once daily.     ATORVASTATIN (LIPITOR) 40 MG TABLET    Take 1 tablet by mouth once daily.    METOPROLOL SUCCINATE (TOPROL-XL) 25 MG 24 HR TABLET    Take 1 tablet by mouth once daily.    MICONAZOLE NITRATE 2% (CRITIC-AID CLEAR AF) 2 % OINT    Apply topically 2 (two) times daily.

## 2019-11-02 LAB — BACTERIA UR CULT: ABNORMAL

## 2020-03-06 ENCOUNTER — OFFICE VISIT (OUTPATIENT)
Dept: PRIMARY CARE CLINIC | Facility: CLINIC | Age: 85
End: 2020-03-06
Payer: MEDICARE

## 2020-03-06 VITALS
SYSTOLIC BLOOD PRESSURE: 110 MMHG | HEIGHT: 65 IN | WEIGHT: 168 LBS | OXYGEN SATURATION: 95 % | TEMPERATURE: 98 F | BODY MASS INDEX: 27.99 KG/M2 | HEART RATE: 75 BPM | RESPIRATION RATE: 17 BRPM | DIASTOLIC BLOOD PRESSURE: 62 MMHG

## 2020-03-06 DIAGNOSIS — Z95.0 STATUS POST PLACEMENT OF OTHER CARDIAC PACEMAKER: ICD-10-CM

## 2020-03-06 DIAGNOSIS — I48.0 AF (PAROXYSMAL ATRIAL FIBRILLATION): ICD-10-CM

## 2020-03-06 DIAGNOSIS — I25.10 CORONARY ARTERY DISEASE INVOLVING NATIVE CORONARY ARTERY OF NATIVE HEART WITHOUT ANGINA PECTORIS: ICD-10-CM

## 2020-03-06 DIAGNOSIS — Z95.1 HX OF CABG: ICD-10-CM

## 2020-03-06 DIAGNOSIS — N30.00 ACUTE CYSTITIS WITHOUT HEMATURIA: Primary | ICD-10-CM

## 2020-03-06 DIAGNOSIS — L24.9 IRRITANT CONTACT DERMATITIS, UNSPECIFIED TRIGGER: ICD-10-CM

## 2020-03-06 DIAGNOSIS — C18.9 MALIGNANT NEOPLASM OF COLON, UNSPECIFIED PART OF COLON: ICD-10-CM

## 2020-03-06 DIAGNOSIS — R21 SKIN RASH: ICD-10-CM

## 2020-03-06 DIAGNOSIS — E78.2 MIXED HYPERLIPIDEMIA: ICD-10-CM

## 2020-03-06 DIAGNOSIS — I49.5: ICD-10-CM

## 2020-03-06 DIAGNOSIS — N18.4 CKD (CHRONIC KIDNEY DISEASE) STAGE 4, GFR 15-29 ML/MIN: ICD-10-CM

## 2020-03-06 PROCEDURE — 99213 OFFICE O/P EST LOW 20 MIN: CPT | Mod: S$PBB,,, | Performed by: FAMILY MEDICINE

## 2020-03-06 PROCEDURE — 96372 THER/PROPH/DIAG INJ SC/IM: CPT | Mod: PBBFAC,PN

## 2020-03-06 PROCEDURE — 99999 PR PBB SHADOW E&M-EST. PATIENT-LVL IV: ICD-10-PCS | Mod: PBBFAC,,, | Performed by: FAMILY MEDICINE

## 2020-03-06 PROCEDURE — 81002 URINALYSIS NONAUTO W/O SCOPE: CPT | Mod: PBBFAC,PN | Performed by: FAMILY MEDICINE

## 2020-03-06 PROCEDURE — 99999 PR PBB SHADOW E&M-EST. PATIENT-LVL IV: CPT | Mod: PBBFAC,,, | Performed by: FAMILY MEDICINE

## 2020-03-06 PROCEDURE — 99213 PR OFFICE/OUTPT VISIT, EST, LEVL III, 20-29 MIN: ICD-10-PCS | Mod: S$PBB,,, | Performed by: FAMILY MEDICINE

## 2020-03-06 PROCEDURE — 99214 OFFICE O/P EST MOD 30 MIN: CPT | Mod: PBBFAC,PN,25 | Performed by: FAMILY MEDICINE

## 2020-03-06 RX ORDER — METHYLPREDNISOLONE 4 MG/1
TABLET ORAL
Qty: 1 PACKAGE | Refills: 0 | Status: ON HOLD | OUTPATIENT
Start: 2020-03-06 | End: 2020-11-20 | Stop reason: HOSPADM

## 2020-03-06 RX ORDER — LISINOPRIL 5 MG/1
5 TABLET ORAL NIGHTLY
Status: ON HOLD | COMMUNITY
Start: 2020-02-22 | End: 2020-11-20 | Stop reason: HOSPADM

## 2020-03-06 RX ORDER — TRIAMCINOLONE ACETONIDE 40 MG/ML
40 INJECTION, SUSPENSION INTRA-ARTICULAR; INTRAMUSCULAR ONCE
Status: COMPLETED | OUTPATIENT
Start: 2020-03-06 | End: 2020-03-06

## 2020-03-06 RX ADMIN — TRIAMCINOLONE ACETONIDE 40 MG: 40 INJECTION, SUSPENSION INTRA-ARTICULAR; INTRAMUSCULAR at 10:03

## 2020-03-06 NOTE — PROGRESS NOTES
Subjective:       Patient ID: Alannah Saleem is a 86 y.o. female.    Chief Complaint: Eye Problem    HPI:  86-year-old female in for irritation around the left periorbital area upper and lower eyelids--itchy--started 10 days ago--last Tuesday--patient was seen in urgent care--given a cream.  And 1 steroid pill.  Periorbital is area is still irritate--slightly erythematous but minimal swelling but still itches.  Patient's vision is fine no ocular pain no diplopia     No new soaps no new detergents no new clothes no new foods no new medications  ROS:  Skin: no psoriasis, eczema, skin cancer--see history of present illness  HEENT: No headache, ocular pain, blurred vision, diplopia, epistaxis, hoarseness change in voice, thyroid trouble +hearing aides  Lung: No pneumonia, asthma, Tb, wheezing, SOB, no smoke  Heart: No chest pain, ankle edema, palpitations, MI, jacoby murmur, + hypertension, + hyperlipidemia +CAD +CABG-5 vessel +atrial fib +sick sinus syndrome-pacemaker +pulmonary hypertension  Abdomen: No nausea, vomiting, diarrhea, constipation, ulcers, hepatitis, gallbladder disease, melena, hematochezia, hematemesis status post cholecystectomy/appendectomy/history colon cancer  Gyn hysterectomy --history breast biopsies in the past--but awhile since had mammogram  : no UTI, renal disease, stones sees kidney MD chronic renal insufficiency stage IV with glomerular filtration rate 15-20--Dr Zhang  MS: no fractures, O/A, lupus, rheumatoid, gout  Neuro: No dizziness, LOC, seizures   No diabetes, no anemia, no anxiety, no depression  , 3 children, retired Lives with daughter and son in law      Objective:   Physical Exam:  General: Well nourished, well developed, no acute distress  Skin:  Dry slightly erythematous skin periorbital area upper and lower eyelid left periorbital area only  HEENT: Eyes PERRLA, EOM intact, I appears normal nose patent, throat non-erythematous   NECK: Supple, no bruits, No JVD, no  nodes  Lungs: Clear, no rales, rhonchi, wheezing  Heart: Regular rate and rhythm, no murmurs, gallops, or rubs  Abdomen: flat, bowel sounds positive, no tenderness, or organomegaly  MS: Range of motion and muscle strength intact  Neuro: Alert, CN intact, oriented X 3  Extremities: No cyanosis, clubbing, or edema         Assessment:       1. Mixed hyperlipidemia    2. Skin rash    3. Irritant contact dermatitis, unspecified trigger    4. Status post placement of other cardiac pacemaker    5. Coronary artery disease involving native coronary artery of native heart without angina pectoris    6. 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 AF (paroxysmal atrial fibrillation)    8. Hx of CABG    9. CKD (chronic kidney disease) stage 4, GFR 15-29 ml/min    10. 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 itch if makes too sleepy could try Atarax 25 mg t.i.d.---Kenalog--Medrol Dosepak  Hypertension hyperlipidemia CAD CABG-5 vessel atrial fib pulmonary hypertension pacemaker due to sick sinus syndrome cardiac mutation  marker--sees cardiologist  Chronic renal insufficiency glomerular filtration rate 15-29 sees   Patient should have lab q.6 months needs CBCs CMP lipid if over year add T4 TSH will leave up to Dr Chua to address  Health maintenance up-to-date   AppointmentDr Chua 2 weeks if not better ?? To dermatologist DR Castle

## 2020-03-06 NOTE — PROGRESS NOTES
Verified pt ID using name and . Allergies verified with pt. Administered Kenalog 40 mg IM in Right VG per physician order using aseptic technique. Aspirated and no blood return noted. Pt tolerated well with no adverse reactions noted.

## 2020-03-17 ENCOUNTER — CLINICAL SUPPORT (OUTPATIENT)
Dept: PRIMARY CARE CLINIC | Facility: CLINIC | Age: 85
End: 2020-03-17
Payer: MEDICARE

## 2020-03-17 ENCOUNTER — TELEPHONE (OUTPATIENT)
Dept: PRIMARY CARE CLINIC | Facility: CLINIC | Age: 85
End: 2020-03-17

## 2020-03-17 DIAGNOSIS — N39.0 URINARY TRACT INFECTION WITHOUT HEMATURIA, SITE UNSPECIFIED: ICD-10-CM

## 2020-03-17 DIAGNOSIS — N39.0 URINARY TRACT INFECTION WITHOUT HEMATURIA, SITE UNSPECIFIED: Primary | ICD-10-CM

## 2020-03-17 PROCEDURE — 87186 SC STD MICRODIL/AGAR DIL: CPT

## 2020-03-17 PROCEDURE — 87077 CULTURE AEROBIC IDENTIFY: CPT

## 2020-03-17 PROCEDURE — 87086 URINE CULTURE/COLONY COUNT: CPT

## 2020-03-17 PROCEDURE — 87088 URINE BACTERIA CULTURE: CPT

## 2020-03-17 NOTE — TELEPHONE ENCOUNTER
"Patients daughter is asking to drop off a urine because her mom is "out of it" and this has happened in the past but has no other symptoms.   "

## 2020-03-17 NOTE — TELEPHONE ENCOUNTER
----- Message from Hedy Aguilera sent at 3/17/2020 12:14 PM CDT -----  Contact: daughter(get)  [phone]  Would like to get medical advice.  Symptoms (please be specific):  UTI  How long has patient had these symptoms: couple of days  Pharmacy name and phone #:  [facility] - [place] - [address] [phone] (Phone)  [phone] (Fax)  Any drug allergies (copy from chart):    See chart  Would the patient rather a call back or a response via [facility]?:  Call back  Comments:  Pt daughter would like to bring in a urine sample.

## 2020-03-17 NOTE — TELEPHONE ENCOUNTER
Orders placed for UA and culture.  Patient has allergies listed for most classes of standard antibiotics, and her renal function precludes the use of many other medications.  Will await results of culture.  Go to ER for any acutely worsening symptoms

## 2020-03-20 LAB — BACTERIA UR CULT: ABNORMAL

## 2020-03-20 RX ORDER — NITROFURANTOIN 25; 75 MG/1; MG/1
100 CAPSULE ORAL 2 TIMES DAILY
Qty: 14 CAPSULE | Refills: 0 | Status: SHIPPED | OUTPATIENT
Start: 2020-03-20 | End: 2020-03-27

## 2020-03-23 ENCOUNTER — TELEPHONE (OUTPATIENT)
Dept: PRIMARY CARE CLINIC | Facility: CLINIC | Age: 85
End: 2020-03-23

## 2020-03-23 NOTE — TELEPHONE ENCOUNTER
----- Message from Laly Aldridge sent at 3/23/2020  9:22 AM CDT -----  Contact: Ru/Daughter [phone]  Test Results Request:    Test Performed: Urine    When & Where: 03/17/2020 [facility]    Please call and advise.    Thank You

## 2020-03-23 NOTE — TELEPHONE ENCOUNTER
Patient daughter, Ru, notified E coli on urine culture, prescription for Macrobid sent to the pharmacy.  This is suboptimal, given patient's renal insufficiency, but she is also allergic to all other oral medications that this bacteria is susceptible to.  If her condition worsens, she will need to go to the emergency room

## 2020-09-29 ENCOUNTER — PATIENT MESSAGE (OUTPATIENT)
Dept: PRIMARY CARE CLINIC | Facility: CLINIC | Age: 85
End: 2020-09-29

## 2020-11-12 PROBLEM — S82.201A CLOSED FRACTURE OF RIGHT FIBULA AND TIBIA: Status: ACTIVE | Noted: 2020-11-12

## 2020-11-12 PROBLEM — S82.401A CLOSED FRACTURE OF RIGHT FIBULA AND TIBIA: Status: ACTIVE | Noted: 2020-11-12

## 2020-11-14 PROBLEM — N18.9 ACUTE KIDNEY INJURY SUPERIMPOSED ON CKD: Status: ACTIVE | Noted: 2020-11-14

## 2020-11-14 PROBLEM — E78.5 HYPERLIPIDEMIA: Status: ACTIVE | Noted: 2018-04-27

## 2020-11-14 PROBLEM — N17.9 AKI (ACUTE KIDNEY INJURY): Status: ACTIVE | Noted: 2020-11-14

## 2020-11-14 PROBLEM — E87.1 HYPONATREMIA: Status: ACTIVE | Noted: 2020-11-14

## 2020-11-14 PROBLEM — I10 HYPERTENSION: Status: ACTIVE | Noted: 2020-11-14

## 2020-11-14 PROBLEM — G93.40 ACUTE ENCEPHALOPATHY: Status: ACTIVE | Noted: 2020-11-14

## 2020-11-15 PROBLEM — D62 ACUTE BLOOD LOSS ANEMIA: Status: ACTIVE | Noted: 2020-11-15

## 2020-11-15 PROBLEM — N39.0 UTI (URINARY TRACT INFECTION): Status: ACTIVE | Noted: 2020-11-15

## 2020-11-16 PROBLEM — B96.20 E. COLI UTI: Status: ACTIVE | Noted: 2020-11-16

## 2020-11-16 PROBLEM — N39.0 E. COLI UTI: Status: ACTIVE | Noted: 2020-11-16

## 2020-11-16 PROBLEM — R33.9 URINARY RETENTION: Status: ACTIVE | Noted: 2020-11-16

## 2020-11-16 PROBLEM — G93.41 ACUTE METABOLIC ENCEPHALOPATHY: Status: ACTIVE | Noted: 2020-11-14

## 2020-11-16 PROBLEM — E87.20 METABOLIC ACIDOSIS: Status: ACTIVE | Noted: 2020-11-16

## 2020-11-17 PROBLEM — S72.453A: Status: ACTIVE | Noted: 2020-11-17

## 2020-11-17 PROBLEM — S72.453A: Status: RESOLVED | Noted: 2020-11-17 | Resolved: 2020-11-17

## 2020-11-17 PROBLEM — S82.401A CLOSED FRACTURE OF RIGHT FIBULA AND TIBIA: Status: ACTIVE | Noted: 2020-11-17

## 2020-11-17 PROBLEM — S82.201A CLOSED FRACTURE OF RIGHT FIBULA AND TIBIA: Status: RESOLVED | Noted: 2020-11-12 | Resolved: 2020-11-17

## 2020-11-17 PROBLEM — S82.401A CLOSED FRACTURE OF RIGHT FIBULA AND TIBIA: Status: RESOLVED | Noted: 2020-11-12 | Resolved: 2020-11-17

## 2020-11-17 PROBLEM — S82.201A CLOSED FRACTURE OF RIGHT FIBULA AND TIBIA: Status: ACTIVE | Noted: 2020-11-17

## 2020-12-09 ENCOUNTER — LAB VISIT (OUTPATIENT)
Dept: LAB | Facility: OTHER | Age: 85
End: 2020-12-09
Payer: MEDICARE

## 2020-12-09 DIAGNOSIS — Z03.818 ENCOUNTER FOR OBSERVATION FOR SUSPECTED EXPOSURE TO OTHER BIOLOGICAL AGENTS RULED OUT: ICD-10-CM

## 2020-12-09 PROCEDURE — U0003 INFECTIOUS AGENT DETECTION BY NUCLEIC ACID (DNA OR RNA); SEVERE ACUTE RESPIRATORY SYNDROME CORONAVIRUS 2 (SARS-COV-2) (CORONAVIRUS DISEASE [COVID-19]), AMPLIFIED PROBE TECHNIQUE, MAKING USE OF HIGH THROUGHPUT TECHNOLOGIES AS DESCRIBED BY CMS-2020-01-R: HCPCS

## 2020-12-11 LAB — SARS-COV-2 RNA RESP QL NAA+PROBE: NOT DETECTED

## 2020-12-16 ENCOUNTER — LAB VISIT (OUTPATIENT)
Dept: LAB | Facility: OTHER | Age: 85
End: 2020-12-16
Payer: MEDICARE

## 2020-12-16 DIAGNOSIS — Z03.818 ENCOUNTER FOR OBSERVATION FOR SUSPECTED EXPOSURE TO OTHER BIOLOGICAL AGENTS RULED OUT: ICD-10-CM

## 2020-12-16 PROCEDURE — U0003 INFECTIOUS AGENT DETECTION BY NUCLEIC ACID (DNA OR RNA); SEVERE ACUTE RESPIRATORY SYNDROME CORONAVIRUS 2 (SARS-COV-2) (CORONAVIRUS DISEASE [COVID-19]), AMPLIFIED PROBE TECHNIQUE, MAKING USE OF HIGH THROUGHPUT TECHNOLOGIES AS DESCRIBED BY CMS-2020-01-R: HCPCS

## 2020-12-17 LAB — SARS-COV-2 RNA RESP QL NAA+PROBE: NOT DETECTED

## 2020-12-29 ENCOUNTER — LAB VISIT (OUTPATIENT)
Dept: LAB | Facility: OTHER | Age: 85
End: 2020-12-29
Payer: MEDICARE

## 2020-12-29 DIAGNOSIS — Z03.818 ENCOUNTER FOR OBSERVATION FOR SUSPECTED EXPOSURE TO OTHER BIOLOGICAL AGENTS RULED OUT: ICD-10-CM

## 2020-12-29 PROCEDURE — U0003 INFECTIOUS AGENT DETECTION BY NUCLEIC ACID (DNA OR RNA); SEVERE ACUTE RESPIRATORY SYNDROME CORONAVIRUS 2 (SARS-COV-2) (CORONAVIRUS DISEASE [COVID-19]), AMPLIFIED PROBE TECHNIQUE, MAKING USE OF HIGH THROUGHPUT TECHNOLOGIES AS DESCRIBED BY CMS-2020-01-R: HCPCS

## 2020-12-30 LAB — SARS-COV-2 RNA RESP QL NAA+PROBE: NOT DETECTED

## 2021-04-05 ENCOUNTER — PATIENT MESSAGE (OUTPATIENT)
Dept: ADMINISTRATIVE | Facility: HOSPITAL | Age: 86
End: 2021-04-05

## 2021-07-06 ENCOUNTER — PATIENT MESSAGE (OUTPATIENT)
Dept: ADMINISTRATIVE | Facility: HOSPITAL | Age: 86
End: 2021-07-06

## 2021-10-05 ENCOUNTER — PATIENT MESSAGE (OUTPATIENT)
Dept: ADMINISTRATIVE | Facility: HOSPITAL | Age: 86
End: 2021-10-05

## 2022-01-21 ENCOUNTER — PATIENT MESSAGE (OUTPATIENT)
Dept: ADMINISTRATIVE | Facility: HOSPITAL | Age: 87
End: 2022-01-21
Payer: MEDICARE